# Patient Record
(demographics unavailable — no encounter records)

---

## 2024-12-17 NOTE — HISTORY OF PRESENT ILLNESS
[FreeTextEntry1] : The patient is a 60 year old male who presents for annual physical examination. [de-identified] : Patient is a 60-year-old male with past medical history significant for prothrombin mutation, lumbar disc herniation, vertebral fracture, hyperlipidemia, anxiety, MGUS, presents for annual wellness exam. Patient under care of urologist, Dr. Keisha Willingham whom he sees annually. Up-to-date with influenza and COVID vaccinations. Up-to-date with colonoscopy.  Last performed by Dr. Au 9/2021.  Advised to repeat in 2026. Up-to-date with influenza vaccine. Offers no new complaints.

## 2024-12-17 NOTE — HEALTH RISK ASSESSMENT
[Good] : ~his/her~  mood as  good [Yes] : Yes [No falls in past year] : Patient reported no falls in the past year [0] : 2) Feeling down, depressed, or hopeless: Not at all (0) [Never] : Never [With Family] : lives with family [# of Members in Household ___] :  household currently consist of [unfilled] member(s) [Employed] : employed [College] : College [] :  [# Of Children ___] : has [unfilled] children [Feels Safe at Home] : Feels safe at home [Fully functional (bathing, dressing, toileting, transferring, walking, feeding)] : Fully functional (bathing, dressing, toileting, transferring, walking, feeding) [Fully functional (using the telephone, shopping, preparing meals, housekeeping, doing laundry, using] : Fully functional and needs no help or supervision to perform IADLs (using the telephone, shopping, preparing meals, housekeeping, doing laundry, using transportation, managing medications and managing finances) [Smoke Detector] : smoke detector [Carbon Monoxide Detector] : carbon monoxide detector [Seat Belt] :  uses seat belt [Sunscreen] : uses sunscreen [Designated Healthcare Proxy] : Designated healthcare proxy [Name: ___] : Health Care Proxy's Name: [unfilled]  [Relationship: ___] : Relationship: [unfilled] [de-identified] : socially [de-identified] : walks daily [[id number]] : 0 [Change in mental status noted] : No change in mental status noted [Language] : denies difficulty with language [Behavior] : denies difficulty with behavior [Handling Complex Tasks] : denies difficulty handling complex tasks [Reasoning] : denies difficulty with reasoning [Reports changes in hearing] : Reports no changes in hearing [Reports changes in vision] : Reports no changes in vision [Reports changes in dental health] : Reports no changes in dental health [ColonoscopyDate] : 9/2021 [ColonoscopyComments] : Dr. Alvarez. 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

## 2024-12-17 NOTE — HEALTH RISK ASSESSMENT
[Good] : ~his/her~  mood as  good [Yes] : Yes [No falls in past year] : Patient reported no falls in the past year [0] : 2) Feeling down, depressed, or hopeless: Not at all (0) [Never] : Never [With Family] : lives with family [# of Members in Household ___] :  household currently consist of [unfilled] member(s) [Employed] : employed [College] : College [] :  [# Of Children ___] : has [unfilled] children [Feels Safe at Home] : Feels safe at home [Fully functional (bathing, dressing, toileting, transferring, walking, feeding)] : Fully functional (bathing, dressing, toileting, transferring, walking, feeding) [Fully functional (using the telephone, shopping, preparing meals, housekeeping, doing laundry, using] : Fully functional and needs no help or supervision to perform IADLs (using the telephone, shopping, preparing meals, housekeeping, doing laundry, using transportation, managing medications and managing finances) [Smoke Detector] : smoke detector [Carbon Monoxide Detector] : carbon monoxide detector [Seat Belt] :  uses seat belt [Sunscreen] : uses sunscreen [Designated Healthcare Proxy] : Designated healthcare proxy [Name: ___] : Health Care Proxy's Name: [unfilled]  [Relationship: ___] : Relationship: [unfilled] [de-identified] : socially [de-identified] : walks daily [[id number]] : 0 [Change in mental status noted] : No change in mental status noted [Language] : denies difficulty with language [Behavior] : denies difficulty with behavior [Handling Complex Tasks] : denies difficulty handling complex tasks [Reasoning] : denies difficulty with reasoning [Reports changes in hearing] : Reports no changes in hearing [Reports changes in vision] : Reports no changes in vision [Reports changes in dental health] : Reports no changes in dental health [ColonoscopyDate] : 9/2021 [ColonoscopyComments] : Dr. Alvarez. 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

## 2024-12-17 NOTE — HISTORY OF PRESENT ILLNESS
[FreeTextEntry1] : The patient is a 60 year old male who presents for annual physical examination. [de-identified] : Patient is a 60-year-old male with past medical history significant for prothrombin mutation, lumbar disc herniation, vertebral fracture, hyperlipidemia, anxiety, MGUS, presents for annual wellness exam. Patient under care of urologist, Dr. Keisha Willingham whom he sees annually. Up-to-date with influenza and COVID vaccinations. Up-to-date with colonoscopy.  Last performed by Dr. Au 9/2021.  Advised to repeat in 2026. Up-to-date with influenza vaccine. Offers no new complaints.

## 2024-12-17 NOTE — ASSESSMENT
[FreeTextEntry1] : Healthcare maintenance: Patient up-to-date with colonoscopy. Age-appropriate preventive care counseling and Healthcare maintenance discussed including but not limited to proper diet, exercise, routine dental care, skin cancer screening, and eye care.

## 2024-12-17 NOTE — PHYSICAL EXAM
[No Acute Distress] : no acute distress [Well Nourished] : well nourished [Well Developed] : well developed [Well-Appearing] : well-appearing [Normal Sclera/Conjunctiva] : normal sclera/conjunctiva [PERRL] : pupils equal round and reactive to light [EOMI] : extraocular movements intact [Normal Outer Ear/Nose] : the outer ears and nose were normal in appearance [Normal Oropharynx] : the oropharynx was normal [Normal TMs] : both tympanic membranes were normal [No JVD] : no jugular venous distention [No Lymphadenopathy] : no lymphadenopathy [Supple] : supple [Thyroid Normal, No Nodules] : the thyroid was normal and there were no nodules present [No Respiratory Distress] : no respiratory distress  [No Accessory Muscle Use] : no accessory muscle use [Clear to Auscultation] : lungs were clear to auscultation bilaterally [Normal Rate] : normal rate  [Regular Rhythm] : with a regular rhythm [Normal S1, S2] : normal S1 and S2 [No Murmur] : no murmur heard [No Carotid Bruits] : no carotid bruits [No Varicosities] : no varicosities [Pedal Pulses Present] : the pedal pulses are present [No Edema] : there was no peripheral edema [No Extremity Clubbing/Cyanosis] : no extremity clubbing/cyanosis [Soft] : abdomen soft [Non Tender] : non-tender [Non-distended] : non-distended [No Masses] : no abdominal mass palpated [No HSM] : no HSM [Normal Bowel Sounds] : normal bowel sounds [Normal Posterior Cervical Nodes] : no posterior cervical lymphadenopathy [Normal Anterior Cervical Nodes] : no anterior cervical lymphadenopathy [No Joint Swelling] : no joint swelling [Grossly Normal Strength/Tone] : grossly normal strength/tone [No Rash] : no rash [Coordination Grossly Intact] : coordination grossly intact [No Focal Deficits] : no focal deficits [Normal Affect] : the affect was normal [Alert and Oriented x3] : oriented to person, place, and time [Normal Insight/Judgement] : insight and judgment were intact

## 2025-01-28 NOTE — HISTORY OF PRESENT ILLNESS
[FreeTextEntry1] : Patient is a 60-year-old male presents for follow-up. [de-identified] : Patient is a 60-year-old male with past medical history significant for prothrombin mutation, lumbar disc herniation, vertebral fracture, hyperlipidemia, anxiety, MGUS, presents for follow-up after recent urgent care visit and diagnosis of shingles. Patient reports that he experienced a burning sensation on left side of chest/upper abdomen over 1 week ago after shoveling snow. A few days later on Thursday noticed rash on the left side. He was seen at urgent care diagnosed with shingles.  Prescribed valacyclovir 1gm TID for 7 days which he has been compliant with. Admits that he continues to have some discomfort at the site of the rash.  Has been taking Tylenol at night as needed. Patient also continues to take gabapentin and diclofenac for chronic back pain which may be alleviating postherpetic neuralgia symptoms. He has otherwise been feeling well and offers no additional complaints.

## 2025-01-28 NOTE — PHYSICAL EXAM
[Normal Sclera/Conjunctiva] : normal sclera/conjunctiva [EOMI] : extraocular movements intact [No Lymphadenopathy] : no lymphadenopathy [Supple] : supple [No Edema] : there was no peripheral edema [Normal] : soft, non-tender, non-distended, no masses palpated, no HSM and normal bowel sounds [Normal Posterior Cervical Nodes] : no posterior cervical lymphadenopathy [Normal Anterior Cervical Nodes] : no anterior cervical lymphadenopathy [Grossly Normal Strength/Tone] : grossly normal strength/tone [No Focal Deficits] : no focal deficits [Normal Affect] : the affect was normal [Normal Insight/Judgement] : insight and judgment were intact [Alert and Oriented x3] : oriented to person, place, and time [de-identified] : Erythematous cluster of vesicles in a linear dermatomal pattern involving left upper abdomen/flank

## 2025-01-28 NOTE — PHYSICAL EXAM
[Normal Sclera/Conjunctiva] : normal sclera/conjunctiva [EOMI] : extraocular movements intact [No Lymphadenopathy] : no lymphadenopathy [Supple] : supple [No Edema] : there was no peripheral edema [Normal] : soft, non-tender, non-distended, no masses palpated, no HSM and normal bowel sounds [Normal Posterior Cervical Nodes] : no posterior cervical lymphadenopathy [Normal Anterior Cervical Nodes] : no anterior cervical lymphadenopathy [Grossly Normal Strength/Tone] : grossly normal strength/tone [No Focal Deficits] : no focal deficits [Normal Affect] : the affect was normal [Alert and Oriented x3] : oriented to person, place, and time [Normal Insight/Judgement] : insight and judgment were intact [de-identified] : Erythematous cluster of vesicles in a linear dermatomal pattern involving left upper abdomen/flank

## 2025-01-28 NOTE — HISTORY OF PRESENT ILLNESS
[FreeTextEntry1] : Patient is a 60-year-old male presents for follow-up. [de-identified] : Patient is a 60-year-old male with past medical history significant for prothrombin mutation, lumbar disc herniation, vertebral fracture, hyperlipidemia, anxiety, MGUS, presents for follow-up after recent urgent care visit and diagnosis of shingles. Patient reports that he experienced a burning sensation on left side of chest/upper abdomen over 1 week ago after shoveling snow. A few days later on Thursday noticed rash on the left side. He was seen at urgent care diagnosed with shingles.  Prescribed valacyclovir 1gm TID for 7 days which he has been compliant with. Admits that he continues to have some discomfort at the site of the rash.  Has been taking Tylenol at night as needed. Patient also continues to take gabapentin and diclofenac for chronic back pain which may be alleviating postherpetic neuralgia symptoms. He has otherwise been feeling well and offers no additional complaints.

## 2025-05-16 NOTE — HISTORY OF PRESENT ILLNESS
[FreeTextEntry1] : NV rash, spot, itch, growth  [de-identified] : LIZABETH HAHN is a 60 year old male presenting for:  1. Redness on the nose that comes and goes. Worse with wine. Does not wear sunscreen. 2. Spot on the left cheek at the site of where he shaves. 3. Scaly rash on the palms of his hands. Asymptomatic. Washes his hands frequently at work.  4. Growth on his right flank for months. Intermittently itchy.  5. Itching on his left arm for a few months. Denying new products to the site. Not moisturizing daily.   Derm Hx: no hx skin cancer  Family Hx: no hx skin cancer  SHx: supervisor for environmental services at [employer]

## 2025-05-16 NOTE — HISTORY OF PRESENT ILLNESS
[FreeTextEntry1] : NV rash, spot, itch, growth  [de-identified] : LIZABETH HAHN is a 60 year old male presenting for:  1. Redness on the nose that comes and goes. Worse with wine. Does not wear sunscreen. 2. Spot on the left cheek at the site of where he shaves. 3. Scaly rash on the palms of his hands. Asymptomatic. Washes his hands frequently at work.  4. Growth on his right flank for months. Intermittently itchy.  5. Itching on his left arm for a few months. Denying new products to the site. Not moisturizing daily.   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

## 2025-05-16 NOTE — ASSESSMENT
[FreeTextEntry1] : #Rosacea, ETT, chronic, not at goal  - Reviewed risks (as well as mitigation strategies for adverse drug events as applicable), benefits, and alternatives of therapy. discussed Mirvaso, PDL, and other less efficacious therapies. prefers minimal treatment at this time. May consider cosmetic consultation for laser in the future  - START sulfacetamide sodium-sulfur 10-5% wash to AA qhs. SED.  - may trial sulfa-lo if not covered by insurance  - Sun protective measures reinforced. OTC sunscreen SPF 30 or higher use regularly  #Excoriated papule, left cheek - suspect from rosacea papule vs. trauma from shaving - Patient advised to return to clinic if there are any changes or additional concerns  # Cyst, right flank - Discussed benign clinical features but that a biopsy/excision can be performed to confirm the diagnosis - Reviewed risks (as well as mitigation strategies for adverse drug events as applicable), benefits, and alternatives of therapy - Referred to Dr. Joe Cormier for excision   #Pruritus, left elbow Likely multifactorial, from underlying MGUS, iron deficiency, and xerosis, though can consider ACD given how localized it is - trial of Triamcinolone 0.1% oint, applied to affected area BID for 2-3 weeks, then off for 1-2 weeks. SED. - r/b/a topical steroids were discussed including but not limited to thinning of the skin, atrophy and dyspigmentation. - principles of dry skin care extensively reviewed including the importance of using an emollient at least once a day and avoiding fragranced products including soap and detergent (All Free and Clear)   #ICD, hands, chronic, not at goal  Exacerbated in the setting of frequent hand washing  - Reviewed risks (as well as mitigation strategies for adverse drug events as applicable), benefits, and alternatives of therapy  - Start triamcinolone bid to AA prn itchy/rash, side effects discussed. Avoid use on face/groin. Use for up to 2 weeks on and 1 week off. Cycle the medication as needed for flare ups.  - continue vaseline under occlusion of cotton gloves overnight   RTC as needed

## 2025-05-16 NOTE — PHYSICAL EXAM
[FreeTextEntry3] : Nose with minimal erythema and a few dilated telangiectasias   Excoriated pink papule on the left cheek   6 mm x 8 mm skin colored nodule with central punctum on the right flank   scaling and cracking of the palmar hands

## 2025-05-16 NOTE — HISTORY OF PRESENT ILLNESS
[FreeTextEntry1] : NV rash, spot, itch, growth  [de-identified] : LIZABETH HAHN is a 60 year old male presenting for:  1. Redness on the nose that comes and goes. Worse with wine. Does not wear sunscreen. 2. Spot on the left cheek at the site of where he shaves. 3. Scaly rash on the palms of his hands. Asymptomatic. Washes his hands frequently at work.  4. Growth on his right flank for months. Intermittently itchy.  5. Itching on his left arm for a few months. Denying new products to the site. Not moisturizing daily.   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

## 2025-07-30 NOTE — HISTORY OF PRESENT ILLNESS
[FreeTextEntry1] : Patient is a 61-year-old male presents for follow-up. [de-identified] : Patient is a 61-year-old male with past medical history significant for prothrombin mutation, lumbar disc herniation, vertebral fracture, hyperlipidemia, anxiety, MGUS, presents for follow-up Patient reports that he was seen by his dermatologist in May for routine exam.   He noted a cyst on his right flank which was evaluated and he was instructed to consult with dermatologic surgeon for excision.  He did not schedule the procedure as he had plans to travel and intended to schedule appointment upon his return. This morning he awoke and felt tenderness in the area of the cyst.  He also noticed that it was red and warm to the touch. Denies associated fever, chills, nausea, vomiting. Patient reports that he is allergic to penicillin but has taken Keflex in the past without incident.  Patient also reports that on July 3 he developed abdominal pain and was seen by his gastroenterologist, Dr. Alvarez at [place].  He was prescribed Cipro and Flagyl for presumed diverticulitis which he took for 2 weeks.  He has since followed up with his gastroenterologist.  His abdominal plain improved however, he admitted to worsening GERD symptoms.  He was advised to continue omeprazole but add famotidine at night.  Patient requests a prescription for this medication as it was never sent into his pharmacy by his gastroenterologist.  Patient requests referral for new gastroenterologist as Dr. Au will no longer be on his insurance plan.  Patient reports that he is due for repeat colonoscopy in the near future.

## 2025-07-30 NOTE — PHYSICAL EXAM
[Normal Sclera/Conjunctiva] : normal sclera/conjunctiva [EOMI] : extraocular movements intact [No Edema] : there was no peripheral edema [Normal] : soft, non-tender, non-distended, no masses palpated, no HSM and normal bowel sounds [Grossly Normal Strength/Tone] : grossly normal strength/tone [No Focal Deficits] : no focal deficits [Normal Affect] : the affect was normal [Alert and Oriented x3] : oriented to person, place, and time [Normal Insight/Judgement] : insight and judgment were intact [de-identified] : Nodular area of erythema, swelling on the right flank.  Tenderness to touch.  No purulent discharge noted.

## 2025-07-30 NOTE — END OF VISIT
Arrived via triage, complains of severe abdominal pain, nausea, vomiting, and diarrhea. States she has cervical and ovarian cancer and thinks she has abdominal strictures. Pt very tearful during triage d/t pain.    [Time Spent: ___ minutes] : I have spent [unfilled] minutes of time on the encounter which excludes teaching and separately reported services.

## 2025-07-30 NOTE — PHYSICAL EXAM
[Normal Sclera/Conjunctiva] : normal sclera/conjunctiva [EOMI] : extraocular movements intact [No Edema] : there was no peripheral edema [Normal] : soft, non-tender, non-distended, no masses palpated, no HSM and normal bowel sounds [Grossly Normal Strength/Tone] : grossly normal strength/tone [No Focal Deficits] : no focal deficits [Normal Affect] : the affect was normal [Alert and Oriented x3] : oriented to person, place, and time [Normal Insight/Judgement] : insight and judgment were intact [de-identified] : Nodular area of erythema, swelling on the right flank.  Tenderness to touch.  No purulent discharge noted.

## 2025-07-30 NOTE — HISTORY OF PRESENT ILLNESS
[FreeTextEntry1] : Patient is a 61-year-old male presents for follow-up. [de-identified] : Patient is a 61-year-old male with past medical history significant for prothrombin mutation, lumbar disc herniation, vertebral fracture, hyperlipidemia, anxiety, MGUS, presents for follow-up Patient reports that he was seen by his dermatologist in May for routine exam.   He noted a cyst on his right flank which was evaluated and he was instructed to consult with dermatologic surgeon for excision.  He did not schedule the procedure as he had plans to travel and intended to schedule appointment upon his return. This morning he awoke and felt tenderness in the area of the cyst.  He also noticed that it was red and warm to the touch. Denies associated fever, chills, nausea, vomiting. Patient reports that he is allergic to penicillin but has taken Keflex in the past without incident.  Patient also reports that on July 3 he developed abdominal pain and was seen by his gastroenterologist, Dr. Alvarez at [place].  He was prescribed Cipro and Flagyl for presumed diverticulitis which he took for 2 weeks.  He has since followed up with his gastroenterologist.  His abdominal plain improved however, he admitted to worsening GERD symptoms.  He was advised to continue omeprazole but add famotidine at night.  Patient requests a prescription for this medication as it was never sent into his pharmacy by his gastroenterologist.  Patient requests referral for new gastroenterologist as Dr. Au will no longer be on his insurance plan.  Patient reports that he is due for repeat colonoscopy in the near future.